# Patient Record
(demographics unavailable — no encounter records)

---

## 2025-01-22 NOTE — RISK ASSESSMENT
[Clinical Interview] : Clinical Interview [Collateral Sources] : Collateral Sources [None in the patient's lifetime] : None in the patient's lifetime [None Known] : none known [No known risk factors] : No known risk factors [Residential stability] : residential stability [Relationship stability] : relationship stability [Sobriety] : sobriety [Yes] : Yes [Depressed mood/Anhedonia] : depressed mood/anhedonia [Severe anxiety, agitation or panic] : severe anxiety, agitation or panic [Non-compliant or not receiving treatment] : non-compliant or not receiving treatment [Triggering events leading to humiliation, shame, and/or despair] : triggering events leading to humiliation, shame, and/or despair (e.g. loss of relationship, financial or health status) (real or anticipated) [Identifies reasons for living] : identifies reasons for living [Supportive social network of family or friends] : supportive social network of family or friends [Responsibility to children, family, or others] : responsibility to children, family, or others [Fear of death/actual act of killing self] : fear of death or the actual act of killing self [Engaged in work or school] : engaged in work or school [Beloved pets] : beloved pets [No] : No [de-identified] : no access to guns

## 2025-01-22 NOTE — ADDENDUM
[FreeTextEntry1] : Patient was seen and examined by me, Dr. Nelda Ward. I reviewed and agreed with the findings and plan as documented in the LM[facility] note, unless noted below. pt denied any current si/i/p. pt is not at imminent risk for suicide or homicide, is able to engage in safety planning, is not grossly manic or psychotic, has parents who are able to follow through with treatment recommendations, and is therefore not meeting criteria for involuntary psychiatric hospitalization. They are appropriate for outpatient management.

## 2025-01-22 NOTE — HISTORY OF PRESENT ILLNESS
[Not Applicable] : Not applicable [Suicidal Behavior/Ideation] : suicidal behavior/ideation [FreeTextEntry1] : Patient is a 17 y/o female, domiciled with parents and 21 y/o sister, currently enrolled at [employer], 11th grade general education, no formal PPH, not currently in outpatient treatment, no prior psychiatric hospitalization, no self-injury or suicide attempts, no aggression/violence, no substance use, no legal issues, no CPS involvement, no trauma/abuse, PMH asthma, presenting today with mother who were self referred for connection to therapy.   [facility] met with the patient who was polite and cooperative. Patient reflects on symptoms and shares that she has struggled with social anxiety since the pandemic as a result of being isolated from peers. Over the years she has made progress in making new friends and working on improving her anxiety, but she continues to struggle. Patient explains that she often finds herself worrying about her friendships, she feels on edge and fears losing these friends. Patient reports overthinking, catastrophic thoughts and jumping to conclusions that she did something wrong in regard to this.  Patient reported dealing with depression last year in response to a toxic relationship. She reports improvements have been noted, but symptoms persist. She continues to endorse intermittent sad mood, difficulties falling asleep at night due to overthinking, low energy and hx poor appetite with recent improvements noted. Patient denies hx NSSIB. She reports passive SI that started last year in response to stressors with ex boyfriend feeling "I don't want to be here anymore". She reports these feelings are often triggered by psychosocial stressor and are fleeting. Patient reports in May 2024 she felt dysregulated and had an impulsive thought that she wanted to stab herself with a knife- she went into her kitchen with intent to harm herself, however when looking for a knife realized she does not want to end her life and watched a TV show instead. Patient reported thinking of her family, friends and future oriented goal of becoming a  as protective factors against suicide. She admits that it is "hard to control her emotions". Patient explains that she randomly gets a "bad attitude" and doesn't know why and feels "stuck" in these feelings when they occur. She expresses interest in learning more about how she can identify triggers and cope with them because she does not want to feel this way. She denies/does not present with symptoms of shelby or psychosis. Patient was able to engage in safety planning. Patient is help seeking and motivated for therapy.   [facility] intern met with patient's mother to gather collateral information. She reported the following. The mother described the patient as a generally happy but reserved child. She explained that the Covid pandemic was difficult for the patient, as she spent most of 7th and 8th grade engaged in remote learning and isolated from peers. As a result, she reports patient became more often sad and developed social anxiety, sharing that she was then, and remains, resistant to participating on Zoom, not wanting to show her face on camera, which can limit opportunities, such as recently declining a job interview due to it being on zoom. She reports patient has a fear of embarrassment or letting others down, which leads to lack of participation in class. Reports patient is nervous to enter into situations where the expectations or outcome is unknown. Notes patient has endorsed headaches when she overthinks. Despite this patient has since re-acclimated well to in person learning, is doing well academically and has a supportive [place] of close friends. Mother reports that currently, the patient can become sad intermittently, with her moods sometimes fluctuating throughout the day and with no apparent trigger. The patient has difficulty identifying why this occurs and would like to speak with someone to gain clarity and coping skills, prompting [facility] visit for connection to therapy. She denies persistent sad moods, or changes to sleep, appetite, interest, energy or motivation. She denies hx NSSIB. Mom reports over the last 2 years patient has made passive suicidal statements such as "I want to kill myself", most recently in December 2024. Mom explained that sometimes this occurs when the patient has an issue in school or with friends, is frustrated or limits are set; other times, the patient will express this with no apparent trigger. Denied the patient has ever endorsed methods, plan or intent. Reports patient sometimes feeling like she wants to isolate from friends and family, even when in the midst of a social outing, with little understanding of why this occurs. The patient has expressed concern to mom that this will impact her friendships long-term. The mother denies any hx of physical or sexual abuse. Denies any history of CPS involvement and denied the presence of firearms in the home. Denies the pt utilizes alcohol or drugs. The mother is highly supportive of the patient being connected to outpatient therapy in order to better understand her moods. [FreeTextEntry2] : Patient has not had any past psychiatric visits, hospitalizations, medication trials or visits with a therapist.    [FreeTextEntry3] : n/a

## 2025-01-22 NOTE — RISK ASSESSMENT
[Clinical Interview] : Clinical Interview [Collateral Sources] : Collateral Sources [None in the patient's lifetime] : None in the patient's lifetime [None Known] : none known [No known risk factors] : No known risk factors [Residential stability] : residential stability [Relationship stability] : relationship stability [Sobriety] : sobriety [Yes] : Yes [Depressed mood/Anhedonia] : depressed mood/anhedonia [Severe anxiety, agitation or panic] : severe anxiety, agitation or panic [Non-compliant or not receiving treatment] : non-compliant or not receiving treatment [Triggering events leading to humiliation, shame, and/or despair] : triggering events leading to humiliation, shame, and/or despair (e.g. loss of relationship, financial or health status) (real or anticipated) [Identifies reasons for living] : identifies reasons for living [Supportive social network of family or friends] : supportive social network of family or friends [Responsibility to children, family, or others] : responsibility to children, family, or others [Fear of death/actual act of killing self] : fear of death or the actual act of killing self [Engaged in work or school] : engaged in work or school [Beloved pets] : beloved pets [No] : No [de-identified] : no access to guns

## 2025-01-22 NOTE — DISCUSSION/SUMMARY
[Low acute suicide risk] : Low acute suicide risk [No] : No [Yes] : Safety Plan completed/updated (for individuals at risk): Yes [FreeTextEntry1] : At present, patient has a low risk of harm to self.  Although patient has risk factors including history of passive SI, not currently in treatment, patient has significant protective factors including strong family/social support, domiciled, lack of prior self-harm, no suicide attempts, no substance use, no shelby, no psychosis, no CAH, no psychiatric hospitalization, current willingness to engage in treatment, participation in safety planning, future orientation with long & short term goals for the future, hopeful, help-seeking, engaged in school & activities, current denial of any SIIP or urges to self-harm, no reported hx of abuse/trauma, no aggression/violence, no access to guns/family is able to means restrict, no legal history.

## 2025-01-22 NOTE — PLAN
[Provision of National Suicide Prevention Lifeline 9-762-611-TALK (9982)] : Provision of national suicide prevention lifeline 3-448-189-talk (8441) [Patient] : patient [Family] : family [Education provided regarding environmental safety/ lethal means restriction] : Education provided regarding environmental safety/ lethal means restriction [None on Record] : none on record [TextBox_9] : therapy [TextBox_11] : psychopharm discussion with mother. mother wants to talk to father first. will provide with resources for psychiatry so family can pursue when they are ready. they were also made aware they can return [facility] in the future if needed  [TextBox_13] : Safety plan completed with patient using the [place] Safety Plan."  The Safety Plan is a best practice recommendation by the Suicide Prevention Resource Center.  Safety planning reviewed with patient & family. Advised to secure all potentially dangerous items from home, including but not limited to sharp objects, weapons, prescription and non-prescription medications, and other lethal means out of patient's reach. They deny having any firearms at home. Parent agreed. Parent and patient advised to visit the nearest ED or call 911 for any worsening symptoms or if safety concerns arise. 1800-LIFENET provided. All involved verbalized understanding.  Patient provided the following responses to the safety plan- Warning Signs: quick mood change, when people are touchey, loud noises, I dont want to be here thoughts  Internal Coping Strategies: doing my makeup, spend time with my dog, talk to someone  Distractions: alyse mari, my room, [facility]  People to call for help: fabiana mom lynda (sister) Professionals to call: Suicide Prevention Lifeline and Crisis Text line provided and encouraged to enter into their phone, contact information for [facility] center during the work week hours. Making the environment safe: Lethal means restriction advised Reason for living: family, thinking of my future, getting , traveling  [TextBox_26] : school consent signed. reached out to school contact.

## 2025-01-22 NOTE — HISTORY OF PRESENT ILLNESS
[Not Applicable] : Not applicable [Suicidal Behavior/Ideation] : suicidal behavior/ideation [FreeTextEntry1] : Patient is a 17 y/o female, domiciled with parents and 19 y/o sister, currently enrolled at [employer], 11th grade general education, no formal PPH, not currently in outpatient treatment, no prior psychiatric hospitalization, no self-injury or suicide attempts, no aggression/violence, no substance use, no legal issues, no CPS involvement, no trauma/abuse, PMH asthma, presenting today with mother who were self referred for connection to therapy.   [facility] met with the patient who was polite and cooperative. Patient reflects on symptoms and shares that she has struggled with social anxiety since the pandemic as a result of being isolated from peers. Over the years she has made progress in making new friends and working on improving her anxiety, but she continues to struggle. Patient explains that she often finds herself worrying about her friendships, she feels on edge and fears losing these friends. Patient reports overthinking, catastrophic thoughts and jumping to conclusions that she did something wrong in regard to this.  Patient reported dealing with depression last year in response to a toxic relationship. She reports improvements have been noted, but symptoms persist. She continues to endorse intermittent sad mood, difficulties falling asleep at night due to overthinking, low energy and hx poor appetite with recent improvements noted. Patient denies hx NSSIB. She reports passive SI that started last year in response to stressors with ex boyfriend feeling "I don't want to be here anymore". She reports these feelings are often triggered by psychosocial stressor and are fleeting. Patient reports in May 2024 she felt dysregulated and had an impulsive thought that she wanted to stab herself with a knife- she went into her kitchen with intent to harm herself, however when looking for a knife realized she does not want to end her life and watched a TV show instead. Patient reported thinking of her family, friends and future oriented goal of becoming a  as protective factors against suicide. She admits that it is "hard to control her emotions". Patient explains that she randomly gets a "bad attitude" and doesn't know why and feels "stuck" in these feelings when they occur. She expresses interest in learning more about how she can identify triggers and cope with them because she does not want to feel this way. She denies/does not present with symptoms of shelby or psychosis. Patient was able to engage in safety planning. Patient is help seeking and motivated for therapy.   [facility] intern met with patient's mother to gather collateral information. She reported the following. The mother described the patient as a generally happy but reserved child. She explained that the Covid pandemic was difficult for the patient, as she spent most of 7th and 8th grade engaged in remote learning and isolated from peers. As a result, she reports patient became more often sad and developed social anxiety, sharing that she was then, and remains, resistant to participating on Zoom, not wanting to show her face on camera, which can limit opportunities, such as recently declining a job interview due to it being on zoom. She reports patient has a fear of embarrassment or letting others down, which leads to lack of participation in class. Reports patient is nervous to enter into situations where the expectations or outcome is unknown. Notes patient has endorsed headaches when she overthinks. Despite this patient has since re-acclimated well to in person learning, is doing well academically and has a supportive [place] of close friends. Mother reports that currently, the patient can become sad intermittently, with her moods sometimes fluctuating throughout the day and with no apparent trigger. The patient has difficulty identifying why this occurs and would like to speak with someone to gain clarity and coping skills, prompting [facility] visit for connection to therapy. She denies persistent sad moods, or changes to sleep, appetite, interest, energy or motivation. She denies hx NSSIB. Mom reports over the last 2 years patient has made passive suicidal statements such as "I want to kill myself", most recently in December 2024. Mom explained that sometimes this occurs when the patient has an issue in school or with friends, is frustrated or limits are set; other times, the patient will express this with no apparent trigger. Denied the patient has ever endorsed methods, plan or intent. Reports patient sometimes feeling like she wants to isolate from friends and family, even when in the midst of a social outing, with little understanding of why this occurs. The patient has expressed concern to mom that this will impact her friendships long-term. The mother denies any hx of physical or sexual abuse. Denies any history of CPS involvement and denied the presence of firearms in the home. Denies the pt utilizes alcohol or drugs. The mother is highly supportive of the patient being connected to outpatient therapy in order to better understand her moods. [FreeTextEntry2] : Patient has not had any past psychiatric visits, hospitalizations, medication trials or visits with a therapist.    [FreeTextEntry3] : n/a

## 2025-01-22 NOTE — PLAN
[Provision of National Suicide Prevention Lifeline 6-480-295-TALK (5652)] : Provision of national suicide prevention lifeline 3-602-345-talk (4059) [Patient] : patient [Family] : family [Education provided regarding environmental safety/ lethal means restriction] : Education provided regarding environmental safety/ lethal means restriction [None on Record] : none on record [TextBox_9] : therapy [TextBox_11] : psychopharm discussion with mother. mother wants to talk to father first. will provide with resources for psychiatry so family can pursue when they are ready. they were also made aware they can return [facility] in the future if needed  [TextBox_13] : Safety plan completed with patient using the [place] Safety Plan."  The Safety Plan is a best practice recommendation by the Suicide Prevention Resource Center.  Safety planning reviewed with patient & family. Advised to secure all potentially dangerous items from home, including but not limited to sharp objects, weapons, prescription and non-prescription medications, and other lethal means out of patient's reach. They deny having any firearms at home. Parent agreed. Parent and patient advised to visit the nearest ED or call 911 for any worsening symptoms or if safety concerns arise. 1800-LIFENET provided. All involved verbalized understanding.  Patient provided the following responses to the safety plan- Warning Signs: quick mood change, when people are touchey, loud noises, I dont want to be here thoughts  Internal Coping Strategies: doing my makeup, spend time with my dog, talk to someone  Distractions: alyse mari, my room, [facility]  People to call for help: fabiana mom lynda (sister) Professionals to call: Suicide Prevention Lifeline and Crisis Text line provided and encouraged to enter into their phone, contact information for [facility] center during the work week hours. Making the environment safe: Lethal means restriction advised Reason for living: family, thinking of my future, getting , traveling  [TextBox_26] : school consent signed. reached out to school contact.

## 2025-01-22 NOTE — HISTORY OF PRESENT ILLNESS
[Not Applicable] : Not applicable [Suicidal Behavior/Ideation] : suicidal behavior/ideation [FreeTextEntry1] : Patient is a 15 y/o female, domiciled with parents and 21 y/o sister, currently enrolled at [employer], 11th grade general education, no formal PPH, not currently in outpatient treatment, no prior psychiatric hospitalization, no self-injury or suicide attempts, no aggression/violence, no substance use, no legal issues, no CPS involvement, no trauma/abuse, PMH asthma, presenting today with mother who were self referred for connection to therapy.   [facility] met with the patient who was polite and cooperative. Patient reflects on symptoms and shares that she has struggled with social anxiety since the pandemic as a result of being isolated from peers. Over the years she has made progress in making new friends and working on improving her anxiety, but she continues to struggle. Patient explains that she often finds herself worrying about her friendships, she feels on edge and fears losing these friends. Patient reports overthinking, catastrophic thoughts and jumping to conclusions that she did something wrong in regard to this.  Patient reported dealing with depression last year in response to a toxic relationship. She reports improvements have been noted, but symptoms persist. She continues to endorse intermittent sad mood, difficulties falling asleep at night due to overthinking, low energy and hx poor appetite with recent improvements noted. Patient denies hx NSSIB. She reports passive SI that started last year in response to stressors with ex boyfriend feeling "I don't want to be here anymore". She reports these feelings are often triggered by psychosocial stressor and are fleeting. Patient reports in May 2024 she felt dysregulated and had an impulsive thought that she wanted to stab herself with a knife- she went into her kitchen with intent to harm herself, however when looking for a knife realized she does not want to end her life and watched a TV show instead. Patient reported thinking of her family, friends and future oriented goal of becoming a  as protective factors against suicide. She admits that it is "hard to control her emotions". Patient explains that she randomly gets a "bad attitude" and doesn't know why and feels "stuck" in these feelings when they occur. She expresses interest in learning more about how she can identify triggers and cope with them because she does not want to feel this way. She denies/does not present with symptoms of shelby or psychosis. Patient was able to engage in safety planning. Patient is help seeking and motivated for therapy.   [facility] intern met with patient's mother to gather collateral information. She reported the following. The mother described the patient as a generally happy but reserved child. She explained that the Covid pandemic was difficult for the patient, as she spent most of 7th and 8th grade engaged in remote learning and isolated from peers. As a result, she reports patient became more often sad and developed social anxiety, sharing that she was then, and remains, resistant to participating on Zoom, not wanting to show her face on camera, which can limit opportunities, such as recently declining a job interview due to it being on zoom. She reports patient has a fear of embarrassment or letting others down, which leads to lack of participation in class. Reports patient is nervous to enter into situations where the expectations or outcome is unknown. Notes patient has endorsed headaches when she overthinks. Despite this patient has since re-acclimated well to in person learning, is doing well academically and has a supportive [place] of close friends. Mother reports that currently, the patient can become sad intermittently, with her moods sometimes fluctuating throughout the day and with no apparent trigger. The patient has difficulty identifying why this occurs and would like to speak with someone to gain clarity and coping skills, prompting [facility] visit for connection to therapy. She denies persistent sad moods, or changes to sleep, appetite, interest, energy or motivation. She denies hx NSSIB. Mom reports over the last 2 years patient has made passive suicidal statements such as "I want to kill myself", most recently in December 2024. Mom explained that sometimes this occurs when the patient has an issue in school or with friends, is frustrated or limits are set; other times, the patient will express this with no apparent trigger. Denied the patient has ever endorsed methods, plan or intent. Reports patient sometimes feeling like she wants to isolate from friends and family, even when in the midst of a social outing, with little understanding of why this occurs. The patient has expressed concern to mom that this will impact her friendships long-term. The mother denies any hx of physical or sexual abuse. Denies any history of CPS involvement and denied the presence of firearms in the home. Denies the pt utilizes alcohol or drugs. The mother is highly supportive of the patient being connected to outpatient therapy in order to better understand her moods. [FreeTextEntry2] : Patient has not had any past psychiatric visits, hospitalizations, medication trials or visits with a therapist.    [FreeTextEntry3] : n/a

## 2025-01-22 NOTE — PLAN
Conjuntivae and eyelids appear normal , Sclerae : White without injection [Provision of National Suicide Prevention Lifeline 9-594-135-TALK (5865)] : Provision of national suicide prevention lifeline 9-486-997-talk (5469) [Patient] : patient [Family] : family [Education provided regarding environmental safety/ lethal means restriction] : Education provided regarding environmental safety/ lethal means restriction [None on Record] : none on record [TextBox_9] : therapy [TextBox_11] : psychopharm discussion with mother. mother wants to talk to father first. will provide with resources for psychiatry so family can pursue when they are ready. they were also made aware they can return [facility] [place] in the future if needed  [TextBox_13] : Safety plan completed with patient using the [place] Safety Plan."  The Safety Plan is a best practice recommendation by the Suicide Prevention [facility].  Safety planning reviewed with patient & family. Advised to secure all potentially dangerous items from home, including but not limited to sharp objects, weapons, prescription and non-prescription medications, and other lethal means out of patient's reach. They deny having any firearms at home. Parent agreed. Parent and patient advised to visit the nearest ED or call 911 for any worsening symptoms or if safety concerns arise. 1800-LIFENET provided. All involved verbalized understanding.  Patient provided the following responses to the safety plan- Warning Signs: quick mood change, when people are touchey, loud noises, I dont want to be here thoughts  Internal Coping Strategies: doing my makeup, spend time with my dog, talk to someone  Distractions: alyse mari, my room, [facility]  People to call for help: fabiana mom lynda (sister) Professionals to call: Suicide Prevention Lifeline and Crisis Text line provided and encouraged to enter into their phone, contact information for [facility] center during the work week hours. Making the environment safe: Lethal means restriction advised Reason for living: family, thinking of my future, getting , traveling  [TextBox_26] : school consent signed. reached out to school contact.

## 2025-01-22 NOTE — RISK ASSESSMENT
[Clinical Interview] : Clinical Interview [Collateral Sources] : Collateral Sources [None in the patient's lifetime] : None in the patient's lifetime [None Known] : none known [No known risk factors] : No known risk factors [Residential stability] : residential stability [Relationship stability] : relationship stability [Sobriety] : sobriety [Yes] : Yes [Depressed mood/Anhedonia] : depressed mood/anhedonia [Severe anxiety, agitation or panic] : severe anxiety, agitation or panic [Non-compliant or not receiving treatment] : non-compliant or not receiving treatment [Triggering events leading to humiliation, shame, and/or despair] : triggering events leading to humiliation, shame, and/or despair (e.g. loss of relationship, financial or health status) (real or anticipated) [Identifies reasons for living] : identifies reasons for living [Supportive social network of family or friends] : supportive social network of family or friends [Responsibility to children, family, or others] : responsibility to children, family, or others [Fear of death/actual act of killing self] : fear of death or the actual act of killing self [Engaged in work or school] : engaged in work or school [Beloved pets] : beloved pets [No] : No [de-identified] : no access to guns